# Patient Record
Sex: FEMALE | Race: WHITE | URBAN - METROPOLITAN AREA
[De-identification: names, ages, dates, MRNs, and addresses within clinical notes are randomized per-mention and may not be internally consistent; named-entity substitution may affect disease eponyms.]

---

## 2021-08-31 ENCOUNTER — OFFICE VISIT (OUTPATIENT)
Dept: URBAN - METROPOLITAN AREA CLINIC 24 | Facility: CLINIC | Age: 46
End: 2021-08-31
Payer: COMMERCIAL

## 2021-08-31 DIAGNOSIS — G50.1 ATYPICAL FACIAL PAIN: ICD-10-CM

## 2021-08-31 DIAGNOSIS — H02.831 DERMATOCHALASIS OF RIGHT UPPER EYELID: ICD-10-CM

## 2021-08-31 DIAGNOSIS — Z79.84 LONG TERM (CURRENT) USE OF ORAL ANTIDIABETIC DRUGS: ICD-10-CM

## 2021-08-31 DIAGNOSIS — H02.834 DERMATOCHALASIS OF LEFT UPPER EYELID: ICD-10-CM

## 2021-08-31 DIAGNOSIS — E11.9 TYPE 2 DIABETES MELLITUS WITHOUT COMPLICATIONS: Primary | ICD-10-CM

## 2021-08-31 DIAGNOSIS — H40.013 OPEN ANGLE WITH BORDERLINE FINDINGS, LOW RISK, BILATERAL: ICD-10-CM

## 2021-08-31 PROCEDURE — 92133 CPTRZD OPH DX IMG PST SGM ON: CPT | Performed by: OPTOMETRIST

## 2021-08-31 PROCEDURE — 92083 EXTENDED VISUAL FIELD XM: CPT | Performed by: OPTOMETRIST

## 2021-08-31 PROCEDURE — 99204 OFFICE O/P NEW MOD 45 MIN: CPT | Performed by: OPTOMETRIST

## 2021-08-31 ASSESSMENT — VISUAL ACUITY
OD: 20/20
OS: 20/20

## 2021-08-31 ASSESSMENT — INTRAOCULAR PRESSURE
OD: 21
OS: 18

## 2021-08-31 NOTE — IMPRESSION/PLAN
Impression: Type 2 diabetes mellitus without complications: D19.6. Plan: No Non- Proliferative Diabetic Retinopathy , no Diabetic Macular Edema and no Neovascularization of the iris, disc, or elsewhere. Discussed ocular and systemic benefits of blood sugar control. Check annually.

## 2021-08-31 NOTE — IMPRESSION/PLAN
Impression: Open angle with borderline findings, low risk, bilateral: H40.013. Plan: Per consult. Monitor.

## 2021-08-31 NOTE — IMPRESSION/PLAN
Impression: Atypical facial pain: G50.1.
-- Gera 26 OU, base 113 Plan: No clear ophth etiology for patients complaint Possible trigeminal neuralgia per discussion. On gabapentin from neurology for peripheral neuralgia. Recommend f/u w/ neurology (has not seen in 2 years).

## 2021-09-07 NOTE — IMPRESSION/PLAN
Impression: Open angle with borderline findings, low risk, bilateral: H40.013.
-- per history / referral
-- Tmax OD: 21, OS: 18
-- fohx glc negative
-- denies sulfa allergy. Plan: Baseline HVF 24-2 (full) and rnfl oct (no thinning) today; Healthy perfused nerves, reasonable IOP. Low index of suspicion // continue to monitor w/o pharmacologic tx.

## 2021-09-07 NOTE — IMPRESSION/PLAN
Impression: Dermatochalasis of right upper eyelid: H02.831. Plan: Pt symptomatic; requests ocplx consult.

## 2021-09-07 NOTE — IMPRESSION/PLAN
Impression: Dermatochalasis of left upper eyelid: H02.834. Plan: Pt symptomatic, requests ocplx consult.

## 2021-11-04 ENCOUNTER — OFFICE VISIT (OUTPATIENT)
Dept: URBAN - METROPOLITAN AREA CLINIC 24 | Facility: CLINIC | Age: 46
End: 2021-11-04
Payer: COMMERCIAL

## 2021-11-04 DIAGNOSIS — H02.9 UNSPECIFIED DISORDER OF EYELID: Primary | ICD-10-CM

## 2021-11-04 PROCEDURE — 99204 OFFICE O/P NEW MOD 45 MIN: CPT | Performed by: OPHTHALMOLOGY

## 2021-11-04 PROCEDURE — 92285 EXTERNAL OCULAR PHOTOGRAPHY: CPT | Performed by: OPHTHALMOLOGY

## 2021-11-04 RX ORDER — PREDNISOLONE ACETATE 10 MG/ML
1 % SUSPENSION/ DROPS OPHTHALMIC
Qty: 5 | Refills: 2 | Status: ACTIVE
Start: 2021-11-04

## 2021-11-04 NOTE — IMPRESSION/PLAN
Impression: Unspecified disorder of eyelid: H02.9. 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 Patient advised to start sleeping with an eye sleeping mask. Patient to start Prednisolone TID for 1week, BID for 1week, QD for 1week.